# Patient Record
Sex: FEMALE
[De-identification: names, ages, dates, MRNs, and addresses within clinical notes are randomized per-mention and may not be internally consistent; named-entity substitution may affect disease eponyms.]

---

## 2019-03-22 PROBLEM — Z00.00 ENCOUNTER FOR PREVENTIVE HEALTH EXAMINATION: Status: ACTIVE | Noted: 2019-03-22

## 2019-03-25 ENCOUNTER — MOBILE ON CALL (OUTPATIENT)
Age: 34
End: 2019-03-25

## 2019-03-26 ENCOUNTER — APPOINTMENT (OUTPATIENT)
Dept: ENDOCRINOLOGY | Facility: CLINIC | Age: 34
End: 2019-03-26
Payer: COMMERCIAL

## 2019-03-26 VITALS
DIASTOLIC BLOOD PRESSURE: 64 MMHG | SYSTOLIC BLOOD PRESSURE: 118 MMHG | HEART RATE: 87 BPM | WEIGHT: 128 LBS | HEIGHT: 61 IN | BODY MASS INDEX: 24.17 KG/M2

## 2019-03-26 DIAGNOSIS — Z83.3 FAMILY HISTORY OF DIABETES MELLITUS: ICD-10-CM

## 2019-03-26 DIAGNOSIS — E03.9 HYPOTHYROIDISM, UNSPECIFIED: ICD-10-CM

## 2019-03-26 PROCEDURE — 99243 OFF/OP CNSLTJ NEW/EST LOW 30: CPT

## 2019-03-26 RX ORDER — LEVOTHYROXINE SODIUM 0.05 MG/1
50 TABLET ORAL DAILY
Qty: 30 | Refills: 5 | Status: ACTIVE | COMMUNITY
Start: 2019-03-26 | End: 1900-01-01

## 2019-03-26 NOTE — ASSESSMENT
[FreeTextEntry1] : Mildly elevated TSH in pregnancy.  Probably has underlying Hashimoto's, given her family history.  thyroid antibodies low positive, but the antibodies don't always test positive (and may be lower during pregnancy).\par Explained increased thyroxine need, especially in the first half of pregnancy, and also lower TSH goals during pregnancy, which lead to higher thyroxine requirement during pregnancy (and this tips some people into hypothyroidism). \par Since TSH was elevated during pregnancy, even though she toward the end of pregnancy, I would start her on 50mcg/day of levothyroxine to put TSH < 3.0 for rest of pregnancy.  TSH is only mildly elevated and free T4 is still in the normal range, so  I am not worried about negative consequence to the baby, but would rather err on the side of overtreating since treatment is very low risk.  She can discontinue thyroxine  after delivery and then i want to recheck TSH 2-3 months post partum to make sure she is still euthyroid.  Advised pt to take thyroxine apart from prenatal vitamin.\par recheck TSH and free T4 in 3 weeks. [facility] form given, or she can do at OB office and send result to me.\par RTO 2-3 months post partum

## 2019-03-26 NOTE — PHYSICAL EXAM
[Alert] : alert [Healthy Appearance] : healthy appearance [Normal Voice/Communication] : normal voice communication [No Proptosis] : no proptosis [No Lid Lag] : no lid lag [Normal Hearing] : hearing was normal [Thyroid Not Enlarged] : the thyroid was not enlarged [No Thyroid Nodules] : there were no palpable thyroid nodules [Clear to Auscultation] : lungs were clear to auscultation bilaterally [Normal S1, S2] : normal S1 and S2 [Regular Rhythm] : with a regular rhythm [Normal Affect] : the affect was normal [Normal Mood] : the mood was normal

## 2019-03-26 NOTE — DATA REVIEWED
[FreeTextEntry1] : 2/19: free T4 1.09, Tg Ab < 10, TPO < 8\par 11/18: TSH 5.03, free T4 1.20, Tg Ab 13, TPO <10\par

## 2019-03-26 NOTE — HISTORY OF PRESENT ILLNESS
[FreeTextEntry1] : Mildly elevated TSH during pregnancy.. now 33 weeks pregnant with second child.  BILL 5/10/19\par Older child is almost 2 years old (has son, expecting another boy)\par mother has thyroid disease (hypothyroid, she thinks)\par does not have symptoms of hypothyroidism--no excessive weight gain, cold intolerance, constipation, fatigue\par \par Meds:\par prenatal, iron

## 2019-03-26 NOTE — CONSULT LETTER
[Dear  ___] : Dear  [unfilled], [Consult Letter:] : I had the pleasure of evaluating your patient, [unfilled]. [Please see my note below.] : Please see my note below. [Consult Closing:] : Thank you very much for allowing me to participate in the care of this patient.  If you have any questions, please do not hesitate to contact me. [Sincerely,] : Sincerely, [FreeTextEntry1] : Ms. Avila is 33 weeks pregnant with her second child, and was found to have mildly elevated TSH during pregnancy.  I am recommending to start levothyroxine 50mcg/day to keep her TSH below 3.0 for the remainder of pregnancy.  once she delivers, she can discontinue thyroxine.\par I would like her TSH rechecked in 3 weeks (she can go to [facility]--I gave her a form, or do at your office), and then I can see her 2-3 months post partum to recheck her TSH, make sure it is normal.\par  [FreeTextEntry3] : Fabiana Lutz MD\par Division of Endocrinology\par [facility]